# Patient Record
(demographics unavailable — no encounter records)

---

## 2025-01-08 NOTE — HEALTH RISK ASSESSMENT
[Yes] : Yes [2 - 4 times a month (2 pts)] : 2-4 times a month (2 points) [1 or 2 (0 pts)] : 1 or 2 (0 points) [Never (0 pts)] : Never (0 points) [No] : In the past 12 months have you used drugs other than those required for medical reasons? No [0] : 2) Feeling down, depressed, or hopeless: Not at all (0) [PHQ-2 Negative - No further assessment needed] : PHQ-2 Negative - No further assessment needed [Never] : Never [Patient reported mammogram was normal] : Patient reported mammogram was normal [Patient reported PAP Smear was normal] : Patient reported PAP Smear was normal [Patient reported bone density results were normal] : Patient reported bone density results were normal [Patient reported colonoscopy was normal] : Patient reported colonoscopy was normal [Excellent] : ~his/her~  mood as  excellent [No falls in past year] : Patient reported no falls in the past year [None] : None [With Family] : lives with family [Retired] : retired [] :  [# Of Children ___] : has [unfilled] children [Fully functional (bathing, dressing, toileting, transferring, walking, feeding)] : Fully functional (bathing, dressing, toileting, transferring, walking, feeding) [Fully functional (using the telephone, shopping, preparing meals, housekeeping, doing laundry, using] : Fully functional and needs no help or supervision to perform IADLs (using the telephone, shopping, preparing meals, housekeeping, doing laundry, using transportation, managing medications and managing finances) [Audit-CScore] : 2 [de-identified] : walking [de-identified] : getting better [[id number]] : 0 [EyeExamDate] : 01/2024 [Reports changes in hearing] : Reports no changes in hearing [Reports changes in vision] : Reports no changes in vision [Reports changes in dental health] : Reports no changes in dental health [MammogramDate] : 12/2023 [PapSmearDate] : 12/2023 [BoneDensityDate] : 11/2023 [ColonoscopyDate] : 01/2025

## 2025-01-08 NOTE — PHYSICAL EXAM
[No Edema] : there was no peripheral edema [Normal] : affect was normal and insight and judgment were intact [de-identified] : verenice sun damage

## 2025-01-08 NOTE — HISTORY OF PRESENT ILLNESS
[de-identified] : 67 year F presents for annual physical exam. 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  Has not seen PCP in many years. GYN apt is next week, Not utd with screening or vaccines Feeling well with no complaints.    left knee OA

## 2025-01-14 NOTE — HISTORY OF PRESENT ILLNESS
According to her medication list she was no longer taking the Ozempic when she was seen recently.  Please confirm she is on 0.5 mg now?  Any problems with nausea or vomiting or any other side effects with it?   [FreeTextEntry1] : PT PRESENTS FOR ANNUAL VISIT. DENIES COMPLAINTS

## 2025-04-03 NOTE — HISTORY OF PRESENT ILLNESS
[FreeTextEntry1] : 67F no PMH, on no medications, referred for +RF of 16 in 1/2025 and arthralgias.  mainly b/l knee pin but also pain in b/l shoulders, to lesser degree in R hand. no joint swelling now but has had L. knee swelling before. takes aspirin daily for pain relief.  no cardiac history.  +morning stiffness, lasting 5 mins. no rashes. no oral/nasal ulcers. L eye dryness at night. No dry mouth. No hair or weight loss. Raynauds. No hematuria.  no personal or family history of psoriasis or IBD.  History of tick bite as a child.   Family history: no autoimmune disease. Father had history of gout.  [Weight Loss] : no weight loss [Malar Facial Rash] : no malar facial rash [Skin Lesions] : no lesions [Skin Nodules] : no skin nodules [Oral Ulcers] : no oral ulcers [Dry Mouth] : no dry mouth [Dysphagia] : no dysphagia [Shortness of Breath] : no shortness of breath [Chest Pain] : no chest pain [Arthralgias] : arthralgias [Joint Swelling] : joint swelling [Morning Stiffness] : morning stiffness [Eye Pain] : no eye pain [Eye Redness] : no eye redness [Dry Eyes] : dry eyes

## 2025-04-03 NOTE — REVIEW OF SYSTEMS
[Dry Eyes] : dryness of the eyes [As Noted in HPI] : as noted in HPI [Arthralgias] : arthralgias [Joint Pain] : joint pain [Joint Swelling] : joint swelling [Joint Stiffness] : joint stiffness [Negative] : Heme/Lymph

## 2025-04-03 NOTE — END OF VISIT
[Time Spent: ___ minutes] : I have spent [unfilled] minutes of time on the encounter which excludes teaching and separately reported services. Cimzia Counseling:  I discussed with the patient the risks of Cimzia including but not limited to immunosuppression, allergic reactions and infections.  The patient understands that monitoring is required including a PPD at baseline and must alert us or the primary physician if symptoms of infection or other concerning signs are noted.

## 2025-04-03 NOTE — PHYSICAL EXAM
[General Appearance - Alert] : alert [General Appearance - In No Acute Distress] : in no acute distress [General Appearance - Well-Appearing] : healthy appearing [Sclera] : the sclera and conjunctiva were normal [Extraocular Movements] : extraocular movements were intact [Outer Ear] : the ears and nose were normal in appearance [Neck Appearance] : the appearance of the neck was normal [Exaggerated Use Of Accessory Muscles For Inspiration] : no accessory muscle use [Edema] : there was no peripheral edema [Musculoskeletal - Swelling] : no joint swelling seen [FreeTextEntry1] : OA changes noted of b/l knees but no warmth, erythema or effusions noted; no joint tenderness noted elsewhere including in b/l hands and shoulders; normal ROM of joints noted [Skin Color & Pigmentation] : normal skin color and pigmentation [] : no rash [Skin Lesions] : no skin lesions [No Focal Deficits] : no focal deficits [Oriented To Time, Place, And Person] : oriented to person, place, and time [Affect] : the affect was normal [Mood] : the mood was normal

## 2025-04-03 NOTE — ASSESSMENT
[FreeTextEntry1] : 67F no PMH, on no medications, referred for +RF of 16 in 1/2025 and arthralgias.  mainly b/l knee pin but also pain in b/l shoulders, to lesser degree in R hand. She has had morning stiffness lasting just a few minutes.   I suspect b/l knee osteoarthritis given her body habitus (she has high BMI of 37); autoimmune review of systems was unremarkable apart from dry eyes- she states she also sleeps with eyes partially open which may contribute. Will still check serologies for SLE, RA, Sjogrens, vasculitis; will check Borrelia abs and uric acid level. TSH recently checked and was normal. Will also check Xray of knees to evaluate for OA changes.   Further plan to follow pending results of above labs and imaging.